# Patient Record
(demographics unavailable — no encounter records)

---

## 2021-02-26 NOTE — NUR
8 CC OF AIR REMOVED OVER 7 MIN FROM NOW DEFLATED RIGHT TR BAND; NO HEMATOMA,NO
PULSATILE BLEEDING AND SOFT NON-TENDER. DISCHARGE INSTRUCTIONS REVIEWED ALL
QUESTIONS ANSWERED. CALL LIGHT IN REACH.

## 2021-02-26 NOTE — NUR
9 CC OF AIR REMOVED OVER 10 MIN FROM NOW DEFLATED RIGHT TR BAND; SOFT
NON-TENDER WITH NO HEMATOMA, NO PULSATILE BLEEDING. DISCHARGE INSTRUCTIONS
REVIEWED ALL QUESTIONS ANSWERED.

## 2021-02-26 NOTE — NUR
DEFALTED RIGHT TR BAND REMOVED AND POLYMEM PLACED OVER RIGHT RADIAL SITE WITH
WRIST BOARD IN PLACE. 20 G IV DISCONTINUED FROM LEFT AC WITH INTACT CANNULA.
RIGHT RADIAL SITE NO HEMATOMA,NO PULSATILE BLEEDING AND SOFT. PT AMBULATED TO
BR TO VOID. PT ESCORTED OUT VIA WHEELCHAIR ESCORT.

## 2021-02-26 NOTE — NUR
PT UP TO THE BATHROOM, GAIT STEADY.  UPON RETURNING SM AMT OF OOZING AT R
RADIAL SITE, SITE CLEANED AND 2 CC AIR REINSTILLED IN TR BAND.  R RADIAL SITE
WITHOUT SWELLING/HEMATOMA.  [facility] UPDATED.

## 2021-02-26 NOTE — NUR
PT RETURNED TO RECOVERY ROOM IN RECLINER. RIGHT RADIAL TR BAND SITE SOFT
NON-TENDER WITH NO HEMATOMA, NO PULSATILE BLEEDING AND WRIST BOARD IN PLACE.
PT DENIES CHEST PAIN. CALL LIGHT IN REACH.